# Patient Record
Sex: MALE | Race: ASIAN | NOT HISPANIC OR LATINO | URBAN - METROPOLITAN AREA
[De-identification: names, ages, dates, MRNs, and addresses within clinical notes are randomized per-mention and may not be internally consistent; named-entity substitution may affect disease eponyms.]

---

## 2018-10-28 ENCOUNTER — EMERGENCY (EMERGENCY)
Facility: HOSPITAL | Age: 18
LOS: 1 days | Discharge: HOME | End: 2018-10-28
Attending: EMERGENCY MEDICINE | Admitting: EMERGENCY MEDICINE
Payer: COMMERCIAL

## 2018-10-28 VITALS
SYSTOLIC BLOOD PRESSURE: 129 MMHG | HEIGHT: 68 IN | TEMPERATURE: 98 F | HEART RATE: 71 BPM | OXYGEN SATURATION: 99 % | RESPIRATION RATE: 18 BRPM | DIASTOLIC BLOOD PRESSURE: 85 MMHG | WEIGHT: 164.91 LBS

## 2018-10-28 PROCEDURE — 12001 RPR S/N/AX/GEN/TRNK 2.5CM/<: CPT

## 2018-10-28 PROCEDURE — 73120 X-RAY EXAM OF HAND: CPT | Mod: 26,RT

## 2018-10-28 PROCEDURE — 73120 X-RAY EXAM OF HAND: CPT

## 2018-10-28 PROCEDURE — 99283 EMERGENCY DEPT VISIT LOW MDM: CPT | Mod: 25

## 2018-10-28 RX ORDER — ISOTRETINOIN 20 MG/1
0 CAPSULE, LIQUID FILLED ORAL
Qty: 0 | Refills: 0 | COMMUNITY

## 2018-10-28 NOTE — ED PROVIDER NOTE - CARE PLAN
Principal Discharge DX:	Laceration of right ring finger without foreign body, nail damage status unspecified, initial encounter  Assessment and plan of treatment:	Follow up with your PMD within 48-72 hours for wound check. Keep wounds covered and dry for 24 hours then clean with soap and water daily.  Apply bacitracin and cover only the wound on the 5th finger. DO NOT APPLY BACITRACIN TO wound on the 4th finger where Dermabond was applied. Let the Dermabond fall off on its own.   Any increased pain, surrounding redness, streaking (red lines), pus, drainage, swelling, fever, chills OR ANY NEW CONCERNING symptoms return to the emergency department. You received a Tdap (tetanus, diphtheria and pertussis) shot today.

## 2018-10-28 NOTE — ED PROVIDER NOTE - ATTENDING CONTRIBUTION TO CARE
pt cut R 4th and 5th fingers few hrs ago while trying to change out broken window frame. cut on glass. no numbness/weakness.  no FB sensation    exam: NAD. R fingers: 4th c 2x2mm oval skin defect  1mm deep over dorsal mid phalanx. minimal blood oozing. 5th finger c 8mm linear lac over mid phalanx, diagonal. no foreign body. no tendon/ligament lac visible throughout ROM.  4th and 5th R finger ROM normal 5/5 strength extension and flexion at PIP/DIP/MCP. nl distal sensation    AP: finger lacs. no FB, no tendon/ligament involvement.  pain management , wound closure c dermabond 4th finger.  wound care 5th digit, nonsuturable

## 2018-10-28 NOTE — ED PROVIDER NOTE - OBJECTIVE STATEMENT
17 y/o M no pmh attempted to change out a glass window that was previously broken, it slipped and cut his right 4th and 5th digits.  Last tdap- within 10 years. Denies numbness, weakness. Unsure if foreign body inside. 17 y/o M no pmh attempted to change out a glass window that was previously broken, it slipped and cut his right 4th and 5th digits.  Last tdap- within 10 years. Denies numbness, weakness. Unsure if foreign body inside. Able to move fingers without difficulty.

## 2018-10-28 NOTE — ED ADULT NURSE NOTE - NSIMPLEMENTINTERV_GEN_ALL_ED
Implemented All Universal Safety Interventions:  [place] to call system. Call bell, personal items and telephone within reach. Instruct patient to call for assistance. Room bathroom lighting operational. Non-slip footwear when patient is off stretcher. Physically safe environment: no spills, clutter or unnecessary equipment. Stretcher in lowest position, wheels locked, appropriate side rails in place.

## 2018-10-28 NOTE — ED PROVIDER NOTE - PRINCIPAL DIAGNOSIS
Laceration of right ring finger without foreign body, nail damage status unspecified, initial encounter

## 2018-10-28 NOTE — ED PROVIDER NOTE - MEDICAL DECISION MAKING DETAILS
4th digit lac- Wound care, Dermabond  5th finger avulsion lac- Wound care, PMD follow up  Up to date on Tdap

## 2018-10-28 NOTE — ED PROVIDER NOTE - PLAN OF CARE
Follow up with your PMD within 48-72 hours for wound check. Keep wounds covered and dry for 24 hours then clean with soap and water daily.  Apply bacitracin and cover only the wound on the 5th finger. DO NOT APPLY BACITRACIN TO wound on the 4th finger where Dermabond was applied. Let the Dermabond fall off on its own.   Any increased pain, surrounding redness, streaking (red lines), pus, drainage, swelling, fever, chills OR ANY NEW CONCERNING symptoms return to the emergency department. You received a Tdap (tetanus, diphtheria and pertussis) shot today.

## 2021-01-27 NOTE — ED PROVIDER NOTE - DURATION
Patient is a 52 yo man referred by Dr. Adam Guillaume for above complaints. A copy of this document will be sent to referring provider. He carries a diagosis of IBS managed by Dr. Carter and Tr in the past. He had acute on chronic abdominal pain few days ago. CT ordered by PCP apparently showed colitis and diverticulosis. He denies diarrhea or rectal bleeding. He has postprandial bloating and intermittent constipation. He is taking fiber supplements with some improvement. He takes Oxycodone 20 mg QID for neck/back pains for 10 years. His last EGD was in 2013. No colonoscopy on file. today

## 2021-05-21 NOTE — ED ADULT NURSE NOTE - BREATHING, MLM
Date of service: 05-21-21 @ 15:17      Patient is a 74y old  Male who presents with a chief complaint of GIOVANNI (21 May 2021 13:06)                                                               INTERVAL HPI/OVERNIGHT EVENTS:    REVIEW OF SYSTEMS:     CONSTITUTIONAL:decreased po intake   weakness   RESPIRATORY: No cough, wheezing,  No shortness of breath  CARDIOVASCULAR: No chest pain or palpitations  GASTROINTESTINAL: No abdominal pain  . No nausea, vomiting, or hematemesis; No diarrhea or constipation. No melena or hematochezia.  GENITOURINARY: No dysuria, frequency or hematuria  NEUROLOGICAL: No numbness or weakness  SKIN: No itching, burning, rashes, or lesions                                                                                                                                                                                                                                                                                 Medications:  MEDICATIONS  (STANDING):  famotidine    Tablet 20 milliGRAM(s) Oral once  heparin   Injectable 5000 Unit(s) SubCutaneous every 8 hours  lactated ringers. 1000 milliLiter(s) (125 mL/Hr) IV Continuous <Continuous>  polyethylene glycol 3350 17 Gram(s) Oral daily  senna 2 Tablet(s) Oral at bedtime    MEDICATIONS  (PRN):  acetaminophen   Tablet .. 975 milliGRAM(s) Oral every 8 hours PRN Mild Pain (1 - 3), Moderate Pain (4 - 6)  ondansetron Injectable 4 milliGRAM(s) IV Push every 6 hours PRN Nausea       Allergies    aspirin (Rash)    Intolerances      Vital Signs Last 24 Hrs  T(C): 36.6 (21 May 2021 11:18), Max: 37.2 (20 May 2021 23:29)  T(F): 97.8 (21 May 2021 11:18), Max: 99 (20 May 2021 23:29)  HR: 61 (21 May 2021 11:18) (61 - 80)  BP: 149/75 (21 May 2021 11:18) (131/73 - 164/80)  BP(mean): 88 (20 May 2021 22:40) (88 - 88)  RR: 18 (21 May 2021 11:18) (18 - 18)  SpO2: 99% (21 May 2021 11:18) (99% - 100%)  CAPILLARY BLOOD GLUCOSE          05-20 @ 07:01  -  05-21 @ 07:00  --------------------------------------------------------  IN: 1000 mL / OUT: 400 mL / NET: 600 mL      Physical Exam:    Daily     Daily   General:  NAD   HEENT:  Nonicteric, PERRLA  CV:  RRR, S1S2   Lungs:  CTA B/L, no wheezes, rales, rhonchi  Abdomen:  Soft, non-tender, no distended, positive BS  Extremities:  2+ pulses, no c/c, no edema  Skin:  Warm and dry, no rashes  :   th[place]odeaux  Neuro:  AAOx3, non-focal, grossly intact                                                                                                                                                                                                                                                                                                LABS:                               11.6   6.38  )-----------( 165      ( 21 May 2021 07:10 )             36.6                      05-21    149<H>  |  115<H>  |  40<H>  ----------------------------<  78  4.9   |  18<L>  |  3.98<H>    Ca    9.8      21 May 2021 07:10  Phos  3.2     05-21    TPro  7.4  /  Alb  3.8  /  TBili  0.3  /  DBili  x   /  AST  49<H>  /  ALT  44  /  AlkPhos  94  05-20                       RADIOLOGY & ADDITIONAL TESTS         I personally reviewed: [  ]EKG   [  ]CXR    [  ] CT      A/P:         Discussed with :     [place] consultants' Notes   Time spent :   Spontaneous, unlabored and symmetrical
